# Patient Record
Sex: MALE | Race: WHITE | URBAN - NONMETROPOLITAN AREA
[De-identification: names, ages, dates, MRNs, and addresses within clinical notes are randomized per-mention and may not be internally consistent; named-entity substitution may affect disease eponyms.]

---

## 2022-12-02 ENCOUNTER — OFFICE VISIT (OUTPATIENT)
Dept: URBAN - NONMETROPOLITAN AREA CLINIC 13 | Facility: CLINIC | Age: 72
End: 2022-12-02
Payer: MEDICARE

## 2022-12-02 DIAGNOSIS — H04.123 DRY EYE SYNDROME OF BILATERAL LACRIMAL GLANDS: ICD-10-CM

## 2022-12-02 DIAGNOSIS — H25.13 AGE-RELATED NUCLEAR CATARACT, BILATERAL: Primary | ICD-10-CM

## 2022-12-02 DIAGNOSIS — H25.11 AGE-RELATED NUCLEAR CATARACT, RIGHT EYE: ICD-10-CM

## 2022-12-02 PROCEDURE — 99204 OFFICE O/P NEW MOD 45 MIN: CPT | Performed by: OPHTHALMOLOGY

## 2022-12-02 ASSESSMENT — INTRAOCULAR PRESSURE
OD: 16
OS: 14

## 2022-12-02 ASSESSMENT — VISUAL ACUITY
OD: 20/30
OS: 20/40

## 2022-12-02 NOTE — IMPRESSION/PLAN
Impression: Age-related nuclear cataract, bilateral: H25.13. Plan: Cataracts account for patient visual complaints  Glasses do not adequately correct vision. Discussed diagnosis in detail with patient. Discussed treatment options with patient. Patient elects to have surgery. Surgical risks and benefits were discussed, explained and understood by patient. OS then OD std iol aim plano OD  plano OS ok dextenza.   order ascan/optical biometry for iol calculation

## 2023-01-09 ENCOUNTER — TESTING ONLY (OUTPATIENT)
Dept: URBAN - NONMETROPOLITAN AREA CLINIC 13 | Facility: CLINIC | Age: 73
End: 2023-01-09
Payer: MEDICARE

## 2023-01-09 DIAGNOSIS — H25.13 AGE-RELATED NUCLEAR CATARACT, BILATERAL: Primary | ICD-10-CM

## 2023-01-09 DIAGNOSIS — Z01.818 ENCOUNTER FOR OTHER PREPROCEDURAL EXAMINATION: Primary | ICD-10-CM

## 2023-01-09 PROCEDURE — 99203 OFFICE O/P NEW LOW 30 MIN: CPT | Performed by: PHYSICIAN ASSISTANT

## 2023-01-23 ENCOUNTER — SURGERY (OUTPATIENT)
Dept: URBAN - NONMETROPOLITAN AREA SURGERY 4 | Facility: SURGERY | Age: 73
End: 2023-01-23
Payer: MEDICARE

## 2023-01-23 DIAGNOSIS — H25.13 AGE-RELATED NUCLEAR CATARACT, BILATERAL: Primary | ICD-10-CM

## 2023-01-23 PROCEDURE — 66984 XCAPSL CTRC RMVL W/O ECP: CPT | Performed by: OPHTHALMOLOGY

## 2023-01-24 ENCOUNTER — POST-OPERATIVE VISIT (OUTPATIENT)
Dept: URBAN - NONMETROPOLITAN AREA CLINIC 13 | Facility: CLINIC | Age: 73
End: 2023-01-24
Payer: MEDICARE

## 2023-01-24 DIAGNOSIS — Z48.810 ENCOUNTER FOR SURGICAL AFTERCARE FOLLOWING SURGERY ON A SENSE ORGAN: Primary | ICD-10-CM

## 2023-01-24 PROCEDURE — 99024 POSTOP FOLLOW-UP VISIT: CPT | Performed by: OPTOMETRIST

## 2023-01-24 ASSESSMENT — INTRAOCULAR PRESSURE: OS: 12

## 2023-01-24 NOTE — IMPRESSION/PLAN
Impression: S/P Cataract Extraction by phacoemulsification with IOL placement OS - 1 Day. Encounter for surgical aftercare following surgery on a sense organ  Z48.810. Excellent post op course   Post operative instructions reviewed - Condition is improving. BCL removed-OS. Plan: RTC 1wk as scheduled.

## 2023-02-06 ENCOUNTER — SURGERY (OUTPATIENT)
Dept: URBAN - NONMETROPOLITAN AREA SURGERY 4 | Facility: SURGERY | Age: 73
End: 2023-02-06
Payer: MEDICARE

## 2023-02-06 DIAGNOSIS — H25.11 AGE-RELATED NUCLEAR CATARACT, RIGHT EYE: Primary | ICD-10-CM

## 2023-02-06 PROCEDURE — 66984 XCAPSL CTRC RMVL W/O ECP: CPT | Performed by: OPHTHALMOLOGY

## 2023-02-07 ENCOUNTER — POST-OPERATIVE VISIT (OUTPATIENT)
Dept: URBAN - NONMETROPOLITAN AREA CLINIC 13 | Facility: CLINIC | Age: 73
End: 2023-02-07
Payer: MEDICARE

## 2023-02-07 DIAGNOSIS — Z96.1 PRESENCE OF INTRAOCULAR LENS: Primary | ICD-10-CM

## 2023-02-07 PROCEDURE — 99024 POSTOP FOLLOW-UP VISIT: CPT | Performed by: OPTOMETRIST

## 2023-02-07 ASSESSMENT — INTRAOCULAR PRESSURE
OD: 13
OS: 13

## 2023-02-07 NOTE — IMPRESSION/PLAN
Impression: S/P Cataract Extraction by phacoemulsification with IOL placement OD - 1 Day. Presence of intraocular lens  Z96.1.  Post operative instructions reviewed - Plan:

## 2023-02-13 ENCOUNTER — POST-OPERATIVE VISIT (OUTPATIENT)
Dept: URBAN - NONMETROPOLITAN AREA CLINIC 13 | Facility: CLINIC | Age: 73
End: 2023-02-13
Payer: MEDICARE

## 2023-02-13 DIAGNOSIS — Z48.810 ENCOUNTER FOR SURGICAL AFTERCARE FOLLOWING SURGERY ON A SENSE ORGAN: Primary | ICD-10-CM

## 2023-02-13 PROCEDURE — 99024 POSTOP FOLLOW-UP VISIT: CPT | Performed by: OPTOMETRIST

## 2023-02-13 ASSESSMENT — VISUAL ACUITY
OS: 20/25
OD: 20/20

## 2023-02-13 ASSESSMENT — INTRAOCULAR PRESSURE
OS: 14
OD: 14

## 2023-02-13 NOTE — IMPRESSION/PLAN
Impression: S/P Cataract Extraction by phacoemulsification with IOL placement OD - 7 Days. Encounter for surgical aftercare following surgery on a sense organ  Z48.810. Excellent post op course   Post operative instructions reviewed - Condition is improving.  Plan:

## 2023-02-20 ENCOUNTER — POST-OPERATIVE VISIT (OUTPATIENT)
Dept: URBAN - NONMETROPOLITAN AREA CLINIC 14 | Facility: CLINIC | Age: 73
End: 2023-02-20
Payer: MEDICARE

## 2023-02-20 DIAGNOSIS — Z48.810 ENCOUNTER FOR SURGICAL AFTERCARE FOLLOWING SURGERY ON A SENSE ORGAN: Primary | ICD-10-CM

## 2023-02-20 PROCEDURE — 99024 POSTOP FOLLOW-UP VISIT: CPT | Performed by: OPTOMETRIST

## 2023-02-20 ASSESSMENT — INTRAOCULAR PRESSURE
OS: 14
OD: 13

## 2023-02-20 ASSESSMENT — VISUAL ACUITY
OS: 20/25
OD: 20/20

## 2023-02-20 NOTE — IMPRESSION/PLAN
Impression: S/P Cataract Extraction by phacoemulsification with IOL placement OD - 14 Days. Encounter for surgical aftercare following surgery on a sense organ  Z48.810. Plan: Pt doing well. Continue ice pack OD PRN. RTC 2 weeks with Dr Phleps Close or sooner with any changes.

## 2023-03-07 ENCOUNTER — POST-OPERATIVE VISIT (OUTPATIENT)
Dept: URBAN - NONMETROPOLITAN AREA CLINIC 13 | Facility: CLINIC | Age: 73
End: 2023-03-07
Payer: MEDICARE

## 2023-03-07 DIAGNOSIS — Z96.1 PRESENCE OF INTRAOCULAR LENS: Primary | ICD-10-CM

## 2023-03-07 PROCEDURE — 99024 POSTOP FOLLOW-UP VISIT: CPT | Performed by: OPTOMETRIST

## 2023-03-07 ASSESSMENT — VISUAL ACUITY
OD: 20/20
OS: 20/20

## 2023-03-07 ASSESSMENT — INTRAOCULAR PRESSURE
OS: 12
OD: 11

## 2023-03-07 NOTE — IMPRESSION/PLAN
Impression: S/P Cataract Extraction by phacoemulsification with IOL placement OD - 29 Days. Presence of intraocular lens  Z96.1.  Plan: S/P post cat sx, rx specs as noted, recheck in 6 months/prn

## 2023-09-11 ENCOUNTER — OFFICE VISIT (OUTPATIENT)
Dept: URBAN - NONMETROPOLITAN AREA CLINIC 13 | Facility: CLINIC | Age: 73
End: 2023-09-11
Payer: MEDICARE

## 2023-09-11 DIAGNOSIS — H43.813 VITREOUS DEGENERATION, BILATERAL: ICD-10-CM

## 2023-09-11 DIAGNOSIS — H26.493 OTHER SECONDARY CATARACT, BILATERAL: ICD-10-CM

## 2023-09-11 DIAGNOSIS — Z96.1 PRESENCE OF INTRAOCULAR LENS: ICD-10-CM

## 2023-09-11 DIAGNOSIS — E11.9 DIABETES MELLITUS TYPE 2 WITHOUT MENTION OF COMPLICATION: Primary | ICD-10-CM

## 2023-09-11 PROCEDURE — 99214 OFFICE O/P EST MOD 30 MIN: CPT | Performed by: OPTOMETRIST

## 2023-09-11 ASSESSMENT — VISUAL ACUITY
OS: 20/20
OD: 20/20

## 2023-09-11 ASSESSMENT — INTRAOCULAR PRESSURE
OS: 12
OD: 13

## 2024-09-12 ENCOUNTER — OFFICE VISIT (OUTPATIENT)
Dept: URBAN - NONMETROPOLITAN AREA CLINIC 13 | Facility: CLINIC | Age: 74
End: 2024-09-12
Payer: MEDICARE

## 2024-09-12 DIAGNOSIS — E11.9 DIABETES MELLITUS TYPE 2 WITHOUT MENTION OF COMPLICATION: Primary | ICD-10-CM

## 2024-09-12 DIAGNOSIS — Z96.1 PRESENCE OF INTRAOCULAR LENS: ICD-10-CM

## 2024-09-12 DIAGNOSIS — H04.123 TEAR FILM INSUFFICIENCY OF BILATERAL LACRIMAL GLANDS: ICD-10-CM

## 2024-09-12 DIAGNOSIS — H26.493 OTHER SECONDARY CATARACT, BILATERAL: ICD-10-CM

## 2024-09-12 DIAGNOSIS — H43.813 VITREOUS DEGENERATION, BILATERAL: ICD-10-CM

## 2024-09-12 DIAGNOSIS — H35.372 PUCKERING OF MACULA, LEFT EYE: ICD-10-CM

## 2024-09-12 DIAGNOSIS — H02.052 TRICHIASIS WITHOUT ENTROPION RIGHT LOWER EYELID: ICD-10-CM

## 2024-09-12 PROCEDURE — 99214 OFFICE O/P EST MOD 30 MIN: CPT | Performed by: OPTOMETRIST

## 2024-09-12 PROCEDURE — 92134 CPTRZ OPH DX IMG PST SGM RTA: CPT | Performed by: OPTOMETRIST

## 2024-09-12 ASSESSMENT — VISUAL ACUITY
OD: 20/20
OS: 20/20

## 2024-09-12 ASSESSMENT — INTRAOCULAR PRESSURE
OD: 14
OS: 12